# Patient Record
Sex: MALE | Race: WHITE | NOT HISPANIC OR LATINO | Employment: FULL TIME | URBAN - METROPOLITAN AREA
[De-identification: names, ages, dates, MRNs, and addresses within clinical notes are randomized per-mention and may not be internally consistent; named-entity substitution may affect disease eponyms.]

---

## 2018-03-19 ENCOUNTER — OFFICE VISIT (OUTPATIENT)
Dept: URGENT CARE | Age: 58
End: 2018-03-19
Payer: COMMERCIAL

## 2018-03-19 VITALS
HEIGHT: 69 IN | DIASTOLIC BLOOD PRESSURE: 58 MMHG | BODY MASS INDEX: 46.65 KG/M2 | HEART RATE: 96 BPM | OXYGEN SATURATION: 96 % | SYSTOLIC BLOOD PRESSURE: 125 MMHG | RESPIRATION RATE: 18 BRPM | TEMPERATURE: 98.2 F | WEIGHT: 315 LBS

## 2018-03-19 DIAGNOSIS — J11.1 INFLUENZA-LIKE ILLNESS: Primary | ICD-10-CM

## 2018-03-19 PROCEDURE — 99203 OFFICE O/P NEW LOW 30 MIN: CPT | Performed by: FAMILY MEDICINE

## 2018-03-19 RX ORDER — OSELTAMIVIR PHOSPHATE 75 MG/1
75 CAPSULE ORAL EVERY 12 HOURS SCHEDULED
Qty: 10 CAPSULE | Refills: 0 | Status: SHIPPED | OUTPATIENT
Start: 2018-03-19 | End: 2018-03-24

## 2018-03-19 RX ORDER — ASPIRIN 81 MG/1
81 TABLET, CHEWABLE ORAL DAILY
COMMUNITY

## 2018-03-19 RX ORDER — ATORVASTATIN CALCIUM 20 MG/1
20 TABLET, FILM COATED ORAL DAILY
COMMUNITY

## 2018-03-19 RX ORDER — LEVOTHYROXINE SODIUM 0.05 MG/1
50 TABLET ORAL DAILY
COMMUNITY

## 2018-03-19 RX ORDER — AMLODIPINE BESYLATE AND BENAZEPRIL HYDROCHLORIDE 5; 10 MG/1; MG/1
1 CAPSULE ORAL DAILY
COMMUNITY

## 2018-03-19 RX ORDER — VALSARTAN 320 MG/1
320 TABLET ORAL DAILY
COMMUNITY

## 2018-03-19 RX ORDER — OMEPRAZOLE 20 MG/1
20 CAPSULE, DELAYED RELEASE ORAL DAILY
COMMUNITY

## 2018-03-19 NOTE — LETTER
March 19, 2018     Patient: Alma Valderrama   YOB: 1960   Date of Visit: 3/19/2018       To Whom It May Concern:    Above pt seen in office today for illness  Recommend no work until without fever / chills for at least 24 hours without having to take anti fever medication  Pt may be out of work the rest of this week with this illness, but may return to work sooner if able         Sincerely,        Quin Oliver PA-C    CC: No Recipients

## 2018-03-19 NOTE — PROGRESS NOTES
[facility]        NAME: Karime Aldrich is a 61 y o  male  : 1960    MRN: [id number]  DATE: 2018  TIME: 6:46 PM    Assessment and Plan   Influenza-like illness [R69]  1  Influenza-like illness  oseltamivir (TAMIFLU) 75 mg capsule         Patient Instructions     Patient Instructions   1  Start Tamiflu and take as directed  2  Bed rest   No work, school or outside activities until without fever for good 24 hours without having to take anti fever medication  3  Push fluids  Stay well hydrated  4  Over-the-counter cough cold medications for symptom relief as directed  5  Follow up with family provider in 7-10 days if not slowly improving  6  If develops any increased difficulty breathing, increasing chest discomfort or inability to take anything by mouth, proceed to emergency room for further evaluation  7  Transmission precautions advised  Chief Complaint     Chief Complaint   Patient presents with    Cough     chest congestion and he feels cold and hot since yesterday  History of Present Illness   Karime Aldrich presents to the clinic c/o    27-year-old male that started getting sick late Saturday night with discomfort in his chest that resolved once he cough  Cough has progressively worsened with nasal congestion, postnasal drip and runny nose with a little bit of shortness of breath off and on  Sore throat, chills, feverish, sweats, malaise and fatigue noted  Taking over-the-counter cold and cough medicine as well as Tylenol and ibuprofen  Tried to go to work today with said he just had a hard time doing his job  Did not have flu shot this year  No known exposures  He works in a [employer]  Review of Systems   Review of Systems   Constitutional: Positive for activity change, appetite change, chills, diaphoresis, fatigue and fever  HENT: Positive for congestion, postnasal drip, rhinorrhea and sore throat  Eyes: Negative  Respiratory: Positive for cough, chest tightness and shortness of breath  Negative for wheezing  Cardiovascular: Negative  Gastrointestinal: Negative  Musculoskeletal: Positive for arthralgias and myalgias  Negative for neck pain and neck stiffness  Skin: Negative  Neurological: Positive for headaches  Current Medications     Long-Term Prescriptions   Medication Sig Dispense Refill    amLODIPine-benazepril (LOTREL 5-10) 5-10 MG per capsule Take 1 capsule by mouth daily      aspirin (ASPIRIN 81) 81 mg chewable tablet Chew 81 mg daily      atorvastatin (LIPITOR) 20 mg tablet Take 20 mg by mouth daily      omeprazole (PriLOSEC) 20 mg delayed release capsule Take 20 mg by mouth daily      valsartan (DIOVAN) 320 MG tablet Take 320 mg by mouth daily         Current Allergies     Allergies as of 03/19/2018    (No Known Allergies)            The following portions of the patient's history were reviewed and updated as appropriate: allergies, current medications, past family history, past medical history, past social history, past surgical history and problem list     Objective   /58   Pulse 96   Temp 98 2 °F (36 8 °C) (Temporal)   Resp 18   Ht 5' 9" (1 753 m)   Wt (!) 145 kg (320 lb)   SpO2 96%   BMI 47 26 kg/m²        Physical Exam     Physical Exam   Constitutional: He is oriented to person, place, and time  He appears well-developed and well-nourished  No distress  HENT:   Head: Normocephalic and atraumatic  Right Ear: External ear normal    Left Ear: External ear normal    Mouth/Throat: Oropharynx is clear and moist  No oropharyngeal exudate  Cobblestoning of the posterior pharynx without gross redness   Eyes: Conjunctivae are normal  Pupils are equal, round, and reactive to light  Right eye exhibits no discharge  Left eye exhibits no discharge  No scleral icterus  Neck: Normal range of motion  Neck supple     Cardiovascular: Normal rate, regular rhythm, normal heart sounds and intact distal pulses  Exam reveals no gallop and no friction rub  No murmur heard  Pulmonary/Chest: Effort normal and breath sounds normal  No respiratory distress  He has no wheezes  He has no rales  Lymphadenopathy:     He has no cervical adenopathy  Neurological: He is alert and oriented to person, place, and time  Skin: Skin is warm and dry  No rash noted  He is not diaphoretic  Psychiatric: He has a normal mood and affect  Nursing note and vitals reviewed

## 2018-03-19 NOTE — PATIENT INSTRUCTIONS
1  Start Tamiflu and take as directed  2  Bed rest   No work, school or outside activities until without fever for good 24 hours without having to take anti fever medication  3  Push fluids  Stay well hydrated  4  Over-the-counter cough cold medications for symptom relief as directed  5  Follow up with family provider in 7-10 days if not slowly improving  6  If develops any increased difficulty breathing, increasing chest discomfort or inability to take anything by mouth, proceed to emergency room for further evaluation  7  Transmission precautions advised